# Patient Record
Sex: MALE | Race: WHITE | NOT HISPANIC OR LATINO
[De-identification: names, ages, dates, MRNs, and addresses within clinical notes are randomized per-mention and may not be internally consistent; named-entity substitution may affect disease eponyms.]

---

## 2017-07-07 ENCOUNTER — APPOINTMENT (OUTPATIENT)
Dept: CARDIOLOGY | Facility: CLINIC | Age: 62
End: 2017-07-07

## 2017-08-02 ENCOUNTER — RX RENEWAL (OUTPATIENT)
Age: 62
End: 2017-08-02

## 2018-01-28 ENCOUNTER — RX RENEWAL (OUTPATIENT)
Age: 63
End: 2018-01-28

## 2018-03-14 ENCOUNTER — APPOINTMENT (OUTPATIENT)
Dept: CARDIOLOGY | Facility: CLINIC | Age: 63
End: 2018-03-14

## 2018-03-14 ENCOUNTER — NON-APPOINTMENT (OUTPATIENT)
Age: 63
End: 2018-03-14

## 2018-03-14 ENCOUNTER — APPOINTMENT (OUTPATIENT)
Dept: CARDIOLOGY | Facility: CLINIC | Age: 63
End: 2018-03-14
Payer: COMMERCIAL

## 2018-03-14 VITALS
RESPIRATION RATE: 16 BRPM | HEART RATE: 87 BPM | DIASTOLIC BLOOD PRESSURE: 84 MMHG | SYSTOLIC BLOOD PRESSURE: 130 MMHG | OXYGEN SATURATION: 95 % | HEIGHT: 68 IN | WEIGHT: 194 LBS | BODY MASS INDEX: 29.4 KG/M2

## 2018-03-14 DIAGNOSIS — R00.2 PALPITATIONS: ICD-10-CM

## 2018-03-14 PROCEDURE — 93000 ELECTROCARDIOGRAM COMPLETE: CPT

## 2018-03-14 PROCEDURE — 99214 OFFICE O/P EST MOD 30 MIN: CPT

## 2018-04-09 ENCOUNTER — APPOINTMENT (OUTPATIENT)
Dept: ELECTROPHYSIOLOGY | Facility: CLINIC | Age: 63
End: 2018-04-09

## 2018-06-25 ENCOUNTER — RX RENEWAL (OUTPATIENT)
Age: 63
End: 2018-06-25

## 2018-09-26 ENCOUNTER — RX RENEWAL (OUTPATIENT)
Age: 63
End: 2018-09-26

## 2018-09-26 ENCOUNTER — NON-APPOINTMENT (OUTPATIENT)
Age: 63
End: 2018-09-26

## 2018-09-26 ENCOUNTER — APPOINTMENT (OUTPATIENT)
Dept: CARDIOLOGY | Facility: CLINIC | Age: 63
End: 2018-09-26
Payer: COMMERCIAL

## 2018-09-26 VITALS
HEART RATE: 74 BPM | BODY MASS INDEX: 28.19 KG/M2 | RESPIRATION RATE: 15 BRPM | WEIGHT: 186 LBS | SYSTOLIC BLOOD PRESSURE: 131 MMHG | HEIGHT: 68 IN | OXYGEN SATURATION: 100 % | DIASTOLIC BLOOD PRESSURE: 85 MMHG

## 2018-09-26 PROCEDURE — 93000 ELECTROCARDIOGRAM COMPLETE: CPT

## 2018-09-26 PROCEDURE — 99215 OFFICE O/P EST HI 40 MIN: CPT

## 2018-09-26 RX ORDER — NICOTINE 21 MG/24HR
14 PATCH, TRANSDERMAL 24 HOURS TRANSDERMAL DAILY
Qty: 14 | Refills: 0 | Status: ACTIVE | COMMUNITY
Start: 2018-09-26 | End: 1900-01-01

## 2018-09-26 RX ORDER — PHENYLEPHRINE HCL 10 MG
7 TABLET ORAL DAILY
Qty: 14 | Refills: 0 | Status: ACTIVE | COMMUNITY
Start: 2018-09-26 | End: 1900-01-01

## 2018-09-26 RX ORDER — NICOTINE 21 MG/24HR
21 PATCH, TRANSDERMAL 24 HOURS TRANSDERMAL
Qty: 28 | Refills: 0 | Status: ACTIVE | COMMUNITY
Start: 2018-09-26 | End: 1900-01-01

## 2018-10-24 ENCOUNTER — APPOINTMENT (OUTPATIENT)
Dept: CARDIOLOGY | Facility: CLINIC | Age: 63
End: 2018-10-24

## 2018-12-19 ENCOUNTER — NON-APPOINTMENT (OUTPATIENT)
Age: 63
End: 2018-12-19

## 2018-12-19 ENCOUNTER — APPOINTMENT (OUTPATIENT)
Dept: CARDIOLOGY | Facility: CLINIC | Age: 63
End: 2018-12-19
Payer: COMMERCIAL

## 2018-12-19 VITALS
OXYGEN SATURATION: 100 % | SYSTOLIC BLOOD PRESSURE: 119 MMHG | BODY MASS INDEX: 29.1 KG/M2 | WEIGHT: 192 LBS | RESPIRATION RATE: 15 BRPM | HEIGHT: 68 IN | HEART RATE: 64 BPM | DIASTOLIC BLOOD PRESSURE: 78 MMHG

## 2018-12-19 DIAGNOSIS — F17.200 NICOTINE DEPENDENCE, UNSPECIFIED, UNCOMPLICATED: ICD-10-CM

## 2018-12-19 PROCEDURE — 93000 ELECTROCARDIOGRAM COMPLETE: CPT

## 2018-12-19 PROCEDURE — 99214 OFFICE O/P EST MOD 30 MIN: CPT

## 2018-12-19 NOTE — DISCUSSION/SUMMARY
[Procedure Low Risk] : the procedure risk is low [Patient Intermediate Risk] : the patient is an intermediate risk [Optimized for Surgery] : the patient is optimized for surgery [FreeTextEntry1] : Without evidence or recent infarction overt heart failure or unstable angina and based upon a satisfactory coronary angiogram with a patent LIMA graft, Mr Shah may undergo planned upper and lower GI endoscopy which constitutes a low risk procedure in  a patient with intermediate cardiac risk at an ASA class II or III anesthesia risk. \par \par

## 2018-12-19 NOTE — PHYSICAL EXAM
[General Appearance - Well Developed] : well developed [Normal Appearance] : normal appearance [Well Groomed] : well groomed [General Appearance - Well Nourished] : well nourished [No Deformities] : no deformities [General Appearance - In No Acute Distress] : no acute distress [Normal Conjunctiva] : the conjunctiva exhibited no abnormalities [Eyelids - No Xanthelasma] : the eyelids demonstrated no xanthelasmas [Normal Oral Mucosa] : normal oral mucosa [No Oral Pallor] : no oral pallor [No Oral Cyanosis] : no oral cyanosis [Normal Jugular Venous A Waves Present] : normal jugular venous A waves present [Normal Jugular Venous V Waves Present] : normal jugular venous V waves present [No Jugular Venous Walker A Waves] : no jugular venous walker A waves [Respiration, Rhythm And Depth] : normal respiratory rhythm and effort [Exaggerated Use Of Accessory Muscles For Inspiration] : no accessory muscle use [Auscultation Breath Sounds / Voice Sounds] : lungs were clear to auscultation bilaterally [Heart Rate And Rhythm] : heart rate and rhythm were normal [Heart Sounds] : normal S1 and S2 [Murmurs] : no murmurs present [Abdomen Soft] : soft [Abdomen Tenderness] : non-tender [Abdomen Mass (___ Cm)] : no abdominal mass palpated [Abnormal Walk] : normal gait [Gait - Sufficient For Exercise Testing] : the gait was sufficient for exercise testing [Nail Clubbing] : no clubbing of the fingernails [Cyanosis, Localized] : no localized cyanosis [Petechial Hemorrhages (___cm)] : no petechial hemorrhages [Skin Color & Pigmentation] : normal skin color and pigmentation [] : no rash [No Venous Stasis] : no venous stasis [Skin Lesions] : no skin lesions [No Skin Ulcers] : no skin ulcer [No Xanthoma] : no  xanthoma was observed [Oriented To Time, Place, And Person] : oriented to person, place, and time [Affect] : the affect was normal [Mood] : the mood was normal [FreeTextEntry1] : Has a support group for anxiety

## 2018-12-19 NOTE — HISTORY OF PRESENT ILLNESS
[Preoperative Visit] : for a medical evaluation prior to surgery [Scheduled Procedure ___] : a [unfilled] [Date of Surgery ___] : on [unfilled] [Surgeon Name ___] : surgeon: [unfilled] [de-identified] : [address] [id number] [FreeTextEntry1] : Dimitry   comes today for clarification of  his overall cardiovascular risk. he  was advised to undergo a complete cardiac evaluation. He denies chest pains shortness of breath or loss of consciousness.

## 2019-01-10 ENCOUNTER — RX RENEWAL (OUTPATIENT)
Age: 64
End: 2019-01-10

## 2019-02-13 ENCOUNTER — NON-APPOINTMENT (OUTPATIENT)
Age: 64
End: 2019-02-13

## 2019-02-13 ENCOUNTER — APPOINTMENT (OUTPATIENT)
Dept: CARDIOLOGY | Facility: CLINIC | Age: 64
End: 2019-02-13
Payer: COMMERCIAL

## 2019-02-13 VITALS
SYSTOLIC BLOOD PRESSURE: 137 MMHG | WEIGHT: 192 LBS | RESPIRATION RATE: 16 BRPM | HEART RATE: 67 BPM | HEIGHT: 68 IN | OXYGEN SATURATION: 93 % | DIASTOLIC BLOOD PRESSURE: 80 MMHG | BODY MASS INDEX: 29.1 KG/M2

## 2019-02-13 PROCEDURE — 99214 OFFICE O/P EST MOD 30 MIN: CPT

## 2019-02-13 PROCEDURE — 93000 ELECTROCARDIOGRAM COMPLETE: CPT

## 2019-02-14 NOTE — DISCUSSION/SUMMARY
[Not Responding to Treatment] : not responding to treatment [FreeTextEntry1] : he still has an elevated triglycerides which may be a  sign of diabetes and abnormal lipids. He is on fish oil 1500 mg. He see Dr Malone and was told  to check  blood work. RV 3 m check A1c and sugar as per medicine

## 2019-02-14 NOTE — REASON FOR VISIT
[FreeTextEntry1] : Dimitry has reflux and was placed on  pantoprazole,now he has no issues with acid reflux. He had it in the center of chest. he got a microbial. infection from a nasal spray. he had symptoms of diabetes. he was given a nasal spray. he used a saline solution.

## 2019-04-07 ENCOUNTER — RX RENEWAL (OUTPATIENT)
Age: 64
End: 2019-04-07

## 2019-06-21 ENCOUNTER — NON-APPOINTMENT (OUTPATIENT)
Age: 64
End: 2019-06-21

## 2019-06-21 ENCOUNTER — APPOINTMENT (OUTPATIENT)
Dept: CARDIOLOGY | Facility: CLINIC | Age: 64
End: 2019-06-21
Payer: COMMERCIAL

## 2019-06-21 VITALS
HEART RATE: 78 BPM | RESPIRATION RATE: 15 BRPM | BODY MASS INDEX: 30.16 KG/M2 | SYSTOLIC BLOOD PRESSURE: 128 MMHG | DIASTOLIC BLOOD PRESSURE: 84 MMHG | OXYGEN SATURATION: 96 % | WEIGHT: 199 LBS | HEIGHT: 68 IN

## 2019-06-21 PROCEDURE — 93000 ELECTROCARDIOGRAM COMPLETE: CPT

## 2019-06-21 PROCEDURE — 99214 OFFICE O/P EST MOD 30 MIN: CPT

## 2019-06-22 NOTE — DISCUSSION/SUMMARY
[Coronary Artery Disease] : coronary artery disease [Stable] : stable [Hyperlipidemia] : hyperlipidemia [de-identified] : will review labs

## 2019-06-22 NOTE — REASON FOR VISIT
[Follow-Up - Clinic] : a clinic follow-up of [Coronary Artery Disease] : coronary artery disease [FreeTextEntry1] : Dimitry reports that an endoscopy was satisfactory he sees a doctor noel Low and that the [facility] office it upper and lower endoscopy is currently off protonic's he takes two fish capsules 1500 mg for triglycerides will review bloodwork from Dr. Warner's office

## 2019-06-22 NOTE — PHYSICAL EXAM
[Normal Appearance] : normal appearance [General Appearance - Well Developed] : well developed [Well Groomed] : well groomed [No Deformities] : no deformities [General Appearance - Well Nourished] : well nourished [General Appearance - In No Acute Distress] : no acute distress [Eyelids - No Xanthelasma] : the eyelids demonstrated no xanthelasmas [Normal Oral Mucosa] : normal oral mucosa [No Oral Pallor] : no oral pallor [Normal Conjunctiva] : the conjunctiva exhibited no abnormalities [No Oral Cyanosis] : no oral cyanosis [Normal Jugular Venous V Waves Present] : normal jugular venous V waves present [Normal Jugular Venous A Waves Present] : normal jugular venous A waves present [Exaggerated Use Of Accessory Muscles For Inspiration] : no accessory muscle use [No Jugular Venous Walker A Waves] : no jugular venous walker A waves [Respiration, Rhythm And Depth] : normal respiratory rhythm and effort [Heart Rate And Rhythm] : heart rate and rhythm were normal [Heart Sounds] : normal S1 and S2 [Auscultation Breath Sounds / Voice Sounds] : lungs were clear to auscultation bilaterally [Abdomen Soft] : soft [Murmurs] : no murmurs present [Abdomen Tenderness] : non-tender [Abdomen Mass (___ Cm)] : no abdominal mass palpated [Abnormal Walk] : normal gait [Cyanosis, Localized] : no localized cyanosis [Nail Clubbing] : no clubbing of the fingernails [Gait - Sufficient For Exercise Testing] : the gait was sufficient for exercise testing [Petechial Hemorrhages (___cm)] : no petechial hemorrhages [] : no ischemic changes [Skin Color & Pigmentation] : normal skin color and pigmentation [Skin Lesions] : no skin lesions [No Xanthoma] : no  xanthoma was observed [No Venous Stasis] : no venous stasis [No Skin Ulcers] : no skin ulcer [Oriented To Time, Place, And Person] : oriented to person, place, and time [Mood] : the mood was normal [Affect] : the affect was normal [FreeTextEntry1] : Has a support group for anxiety

## 2019-07-04 RX ORDER — ATORVASTATIN CALCIUM 80 MG/1
80 TABLET, FILM COATED ORAL
Qty: 90 | Refills: 1 | Status: ACTIVE | COMMUNITY
Start: 2019-07-04

## 2019-07-19 ENCOUNTER — RX RENEWAL (OUTPATIENT)
Age: 64
End: 2019-07-19

## 2019-08-13 ENCOUNTER — APPOINTMENT (OUTPATIENT)
Dept: CARDIOLOGY | Facility: CLINIC | Age: 64
End: 2019-08-13
Payer: COMMERCIAL

## 2019-08-13 PROCEDURE — 78452 HT MUSCLE IMAGE SPECT MULT: CPT

## 2019-08-13 PROCEDURE — 93015 CV STRESS TEST SUPVJ I&R: CPT

## 2019-08-13 PROCEDURE — A9500: CPT

## 2019-09-27 ENCOUNTER — APPOINTMENT (OUTPATIENT)
Dept: CARDIOLOGY | Facility: CLINIC | Age: 64
End: 2019-09-27

## 2019-10-20 ENCOUNTER — RX RENEWAL (OUTPATIENT)
Age: 64
End: 2019-10-20

## 2020-01-25 ENCOUNTER — RX RENEWAL (OUTPATIENT)
Age: 65
End: 2020-01-25

## 2020-01-31 ENCOUNTER — APPOINTMENT (OUTPATIENT)
Dept: CARDIOLOGY | Facility: CLINIC | Age: 65
End: 2020-01-31
Payer: COMMERCIAL

## 2020-01-31 VITALS
HEART RATE: 62 BPM | OXYGEN SATURATION: 99 % | DIASTOLIC BLOOD PRESSURE: 80 MMHG | BODY MASS INDEX: 30.31 KG/M2 | HEIGHT: 68 IN | SYSTOLIC BLOOD PRESSURE: 139 MMHG | WEIGHT: 200 LBS | RESPIRATION RATE: 17 BRPM

## 2020-01-31 PROCEDURE — 99214 OFFICE O/P EST MOD 30 MIN: CPT

## 2020-01-31 PROCEDURE — 93000 ELECTROCARDIOGRAM COMPLETE: CPT

## 2020-02-02 NOTE — PHYSICAL EXAM
[General Appearance - Well Developed] : well developed [Well Groomed] : well groomed [Normal Appearance] : normal appearance [General Appearance - Well Nourished] : well nourished [General Appearance - In No Acute Distress] : no acute distress [No Deformities] : no deformities [Eyelids - No Xanthelasma] : the eyelids demonstrated no xanthelasmas [Normal Conjunctiva] : the conjunctiva exhibited no abnormalities [No Oral Pallor] : no oral pallor [Normal Oral Mucosa] : normal oral mucosa [Normal Jugular Venous A Waves Present] : normal jugular venous A waves present [No Oral Cyanosis] : no oral cyanosis [Normal Jugular Venous V Waves Present] : normal jugular venous V waves present [No Jugular Venous Walker A Waves] : no jugular venous walker A waves [Respiration, Rhythm And Depth] : normal respiratory rhythm and effort [Exaggerated Use Of Accessory Muscles For Inspiration] : no accessory muscle use [Auscultation Breath Sounds / Voice Sounds] : lungs were clear to auscultation bilaterally [Heart Rate And Rhythm] : heart rate and rhythm were normal [Heart Sounds] : normal S1 and S2 [Murmurs] : no murmurs present [Abdomen Soft] : soft [Abdomen Tenderness] : non-tender [Gait - Sufficient For Exercise Testing] : the gait was sufficient for exercise testing [Abnormal Walk] : normal gait [Nail Clubbing] : no clubbing of the fingernails [Abdomen Mass (___ Cm)] : no abdominal mass palpated [Cyanosis, Localized] : no localized cyanosis [Skin Color & Pigmentation] : normal skin color and pigmentation [Petechial Hemorrhages (___cm)] : no petechial hemorrhages [] : no rash [No Venous Stasis] : no venous stasis [Skin Lesions] : no skin lesions [No Xanthoma] : no  xanthoma was observed [No Skin Ulcers] : no skin ulcer [Oriented To Time, Place, And Person] : oriented to person, place, and time [Mood] : the mood was normal [Affect] : the affect was normal [FreeTextEntry1] : Has a support group for anxiety

## 2020-02-02 NOTE — ASSESSMENT
[FreeTextEntry1] : A recent nuclear stress test is negative for ischemia . However,  this may be a balanced situation. we are concerned that his current symptoms may even reflect a recurrent ischemia and therefore asked Jeb to undergo a surgical evaluation with Dr. Mixon or Dr. Rainey  at [facility]\par \par  medical necessity\par  this is a moderate risk encounter based upon concern over recurrent symptoms which may represent angina and review of prior cardiac catheterization demonstrating advanced coronary disease requiring referral for a cardiovascular surgical evaluation at a tertiary hospital

## 2020-02-02 NOTE — REASON FOR VISIT
[Coronary Artery Disease] : coronary artery disease [Follow-Up - Clinic] : a clinic follow-up of [CABG Follow-up] : bypass graft [Hyperlipidemia] : hyperlipidemia [Medication Management] : Medication management [FreeTextEntry1] : Jeb has acid reflux. He can not eat  peaqnut butter othrewise he gets reflux. it thors the whole system off. he has bad reflux. he is stil smoking.  Jeb is otherwise doing well however we know that his cornary anatomy is difficult given closed native vessels and he is protected only by a patent internal memory artery.

## 2020-02-04 ENCOUNTER — NON-APPOINTMENT (OUTPATIENT)
Age: 65
End: 2020-02-04

## 2020-02-25 ENCOUNTER — APPOINTMENT (OUTPATIENT)
Dept: CARDIOTHORACIC SURGERY | Facility: CLINIC | Age: 65
End: 2020-02-25
Payer: COMMERCIAL

## 2020-02-25 VITALS
RESPIRATION RATE: 12 BRPM | TEMPERATURE: 98 F | DIASTOLIC BLOOD PRESSURE: 93 MMHG | HEART RATE: 64 BPM | OXYGEN SATURATION: 96 % | SYSTOLIC BLOOD PRESSURE: 152 MMHG

## 2020-02-25 VITALS — WEIGHT: 200 LBS | HEIGHT: 68 IN | BODY MASS INDEX: 30.31 KG/M2

## 2020-02-25 DIAGNOSIS — Z82.3 FAMILY HISTORY OF STROKE: ICD-10-CM

## 2020-02-25 DIAGNOSIS — K21.0 GASTRO-ESOPHAGEAL REFLUX DISEASE WITH ESOPHAGITIS: ICD-10-CM

## 2020-02-25 DIAGNOSIS — Z82.5 FAMILY HISTORY OF ASTHMA AND OTHER CHRONIC LOWER RESPIRATORY DISEASES: ICD-10-CM

## 2020-02-25 DIAGNOSIS — K21.9 GASTRO-ESOPHAGEAL REFLUX DISEASE W/OUT ESOPHAGITIS: ICD-10-CM

## 2020-02-25 PROCEDURE — 99203 OFFICE O/P NEW LOW 30 MIN: CPT

## 2020-02-25 RX ORDER — SERTRALINE 25 MG/1
25 TABLET, FILM COATED ORAL EVERY OTHER DAY
Refills: 0 | Status: ACTIVE | COMMUNITY
Start: 2020-02-25

## 2020-02-25 RX ORDER — FAMOTIDINE 10 MG/1
10 TABLET, FILM COATED ORAL DAILY
Refills: 0 | Status: ACTIVE | COMMUNITY
Start: 2020-02-25

## 2020-02-25 RX ORDER — ASPIRIN 325 MG/1
325 TABLET, FILM COATED ORAL DAILY
Qty: 15 | Refills: 0 | Status: ACTIVE | COMMUNITY
Start: 2020-02-25

## 2020-02-25 NOTE — REVIEW OF SYSTEMS
[Feeling Tired] : feeling tired [Heartburn] : heartburn [Anxiety] : anxiety [Negative] : Endocrine [Palpitations] : no palpitations [Chest Pain] : no chest pain [Lower Ext Edema] : no extremity edema [Dizziness] : no dizziness [Fainting] : no fainting

## 2020-02-25 NOTE — CONSULT LETTER
[Dear  ___] : Dear ~SILVANO, [Courtesy Letter:] : I had the pleasure of seeing your patient, [unfilled], in my office today. [Please see my note below.] : Please see my note below. [Consult Closing:] : Thank you very much for allowing me to participate in the care of this patient.  If you have any questions, please do not hesitate to contact me. [Sincerely,] : Sincerely, [FreeTextEntry2] : Santiago Lynn MD\par [address]\[place]  [address] [FreeTextEntry3] : Arben Mixon MD\par  & \par \par Cardiovascular & Thoracic Surgery\par [facility] \par [address]\par [facility] [address]\par

## 2020-02-25 NOTE — PHYSICAL EXAM
[General Appearance - In No Acute Distress] : in no acute distress [General Appearance - Alert] : alert [Extraocular Movements] : extraocular movements were intact [PERRL With Normal Accommodation] : pupils were equal in size, round, and reactive to light [Sclera] : the sclera and conjunctiva were normal [Jugular Venous Distention Increased] : there was no jugular-venous distention [] : no respiratory distress [Outer Ear] : the ears and nose were normal in appearance [Heart Rate And Rhythm] : heart rate was normal and rhythm regular [Respiration, Rhythm And Depth] : normal respiratory rhythm and effort [Surgical / Traumatic Scar Sternum] : a scar [Breast Appearance] : normal in appearance [Bowel Sounds] : normal bowel sounds [Abdomen Soft] : soft [Cervical Lymph Nodes Enlarged Posterior Bilaterally] : posterior cervical [Skin Color & Pigmentation] : normal skin color and pigmentation [Involuntary Movements] : no involuntary movements were seen [No CVA Tenderness] : no ~M costovertebral angle tenderness [Impaired Insight] : insight and judgment were intact [Oriented To Time, Place, And Person] : oriented to person, place, and time [No Focal Deficits] : no focal deficits [Right Carotid Bruit] : no bruit heard over the right carotid [Left Carotid Bruit] : no bruit heard over the left carotid [FreeTextEntry1] : deferred

## 2020-02-25 NOTE — HISTORY OF PRESENT ILLNESS
[FreeTextEntry1] : SELENA MENJIVAR is a 64 year old  male current smoker (1 ppd) with PMH of anxiety, CAD/MI (s/p CABG, LIMA to LAD in 1997 by Dr. Mclain at [facility]), HTN, HLD and GERD. He follows with Dr. MARIAM Lynn (x 20 years) for his cardiology care.  He was referred for surgical consultation due to his coronary anatomy being difficult given closed native vessels and single patent internal mammary artery. In august 2018, he was experiencing indigestion and he reports "having a heart attack." At 4 am he felt his chest was heavy or a "bubble", at 11 am he went [place] underwent a cardiac catherization which revealed 2 occluded grafts, patent LIMA to LAD. Metoprolol was increased. He felt "chest bubble" 1 week ago. He took Pepcid and the feeling subsided. EGD/colonoscopy which was unremarkable within the last 1-2 years. He takes Pepcid PRN. \par \par GI: Dr. Mcnamara ([place])\par Cards: Dr. Santiago Lynn

## 2020-02-25 NOTE — DATA REVIEWED
[FreeTextEntry1] : Echo: 7/19/18\par normal LV function LVEF 65-70%. \par \par Cardiac Catherization on 8/14/18 demonstrated \par LIMA to LAD normal \par om 1 rca 1 occluded \par

## 2020-02-25 NOTE — ASSESSMENT
[FreeTextEntry1] : SELENA MENJIVAR is a 64 year old  male current smoker (1 ppd x 45 years) with PMH of anxiety, CAD/MI (s/p 3VCABG, LIMA to LAD in 1997 by Dr. Mclain at [facility]), HTN, HLD and GERD. He follows with Dr. MARIAM Lynn (x 20 years) for his cardiology care.  He was referred for surgical consultation.  In August 2018, he was experiencing indigestion and he reports feeling as if " I was having a heart attack." At 4 am he felt his chest was heavy or a "bubble", at 11 am he went [place] underwent a cardiac Catherization which revealed 2 occluded grafts and patent LIMA to LAD. Metoprolol dose was increased. He felt "chest bubble" 1 week ago. He took Pepcid and the feeling subsided. He underwent an EGD/colonoscopy which was unremarkable within the last 1-2 years.\par \par Cardiac imaging, medical records and reports reviewed at length with patient. Coronary artery disease with anginal symptoms. Risks, benefits and alternatives to surgery discussed. Risks included but not limited to bleeding, stroke, myocardial Infarction, kidney problems, blood transfusion, permanent pacemaker implantation, infections and death. Operative mortality and complication risks are low. Various approaches discussed in detail. All questions have been fully answered and concerns addressed. \par \par Plan:\par 1) Repeat Cardiac catherization \par 2) Smoking cessation\par 3) Continue current medication regimen \par \par  Written by Delisa Livingston NP, acting as a scribe for Dr. Mixon.\par “The documentation recorded by the scribe accurately reflects the service I personally performed and the decisions made by me.” Signature Arben Mixon MD.\par \par \par \par \par

## 2020-03-04 ENCOUNTER — OUTPATIENT (OUTPATIENT)
Dept: OUTPATIENT SERVICES | Facility: HOSPITAL | Age: 65
LOS: 1 days | End: 2020-03-04
Payer: COMMERCIAL

## 2020-03-04 VITALS
DIASTOLIC BLOOD PRESSURE: 71 MMHG | WEIGHT: 199.96 LBS | RESPIRATION RATE: 16 BRPM | SYSTOLIC BLOOD PRESSURE: 122 MMHG | HEART RATE: 83 BPM | HEIGHT: 68 IN | OXYGEN SATURATION: 92 % | TEMPERATURE: 99 F

## 2020-03-04 DIAGNOSIS — I25.10 ATHEROSCLEROTIC HEART DISEASE OF NATIVE CORONARY ARTERY WITHOUT ANGINA PECTORIS: ICD-10-CM

## 2020-03-04 DIAGNOSIS — Z95.1 PRESENCE OF AORTOCORONARY BYPASS GRAFT: Chronic | ICD-10-CM

## 2020-03-04 LAB
ANION GAP SERPL CALC-SCNC: 13 MMOL/L — SIGNIFICANT CHANGE UP (ref 5–17)
BUN SERPL-MCNC: 14 MG/DL — SIGNIFICANT CHANGE UP (ref 7–23)
CALCIUM SERPL-MCNC: 9.3 MG/DL — SIGNIFICANT CHANGE UP (ref 8.4–10.5)
CHLORIDE SERPL-SCNC: 103 MMOL/L — SIGNIFICANT CHANGE UP (ref 96–108)
CO2 SERPL-SCNC: 21 MMOL/L — LOW (ref 22–31)
CREAT SERPL-MCNC: 0.68 MG/DL — SIGNIFICANT CHANGE UP (ref 0.5–1.3)
GLUCOSE SERPL-MCNC: 114 MG/DL — HIGH (ref 70–99)
HCT VFR BLD CALC: 47.4 % — SIGNIFICANT CHANGE UP (ref 39–50)
HGB BLD-MCNC: 14.6 G/DL — SIGNIFICANT CHANGE UP (ref 13–17)
MCHC RBC-ENTMCNC: 28.3 PG — SIGNIFICANT CHANGE UP (ref 27–34)
MCHC RBC-ENTMCNC: 30.8 GM/DL — LOW (ref 32–36)
MCV RBC AUTO: 92 FL — SIGNIFICANT CHANGE UP (ref 80–100)
NRBC # BLD: 0 /100 WBCS — SIGNIFICANT CHANGE UP (ref 0–0)
PLATELET # BLD AUTO: 190 K/UL — SIGNIFICANT CHANGE UP (ref 150–400)
POTASSIUM SERPL-MCNC: 5 MMOL/L — SIGNIFICANT CHANGE UP (ref 3.5–5.3)
POTASSIUM SERPL-SCNC: 5 MMOL/L — SIGNIFICANT CHANGE UP (ref 3.5–5.3)
RBC # BLD: 5.15 M/UL — SIGNIFICANT CHANGE UP (ref 4.2–5.8)
RBC # FLD: 13.6 % — SIGNIFICANT CHANGE UP (ref 10.3–14.5)
SODIUM SERPL-SCNC: 137 MMOL/L — SIGNIFICANT CHANGE UP (ref 135–145)
WBC # BLD: 10.81 K/UL — HIGH (ref 3.8–10.5)
WBC # FLD AUTO: 10.81 K/UL — HIGH (ref 3.8–10.5)

## 2020-03-04 PROCEDURE — 85027 COMPLETE CBC AUTOMATED: CPT

## 2020-03-04 PROCEDURE — 80048 BASIC METABOLIC PNL TOTAL CA: CPT

## 2020-03-04 PROCEDURE — C1894: CPT

## 2020-03-04 PROCEDURE — 93005 ELECTROCARDIOGRAM TRACING: CPT

## 2020-03-04 PROCEDURE — 99153 MOD SED SAME PHYS/QHP EA: CPT

## 2020-03-04 PROCEDURE — 93567 NJX CAR CTH SPRVLV AORTGRPHY: CPT

## 2020-03-04 PROCEDURE — C1887: CPT

## 2020-03-04 PROCEDURE — C1769: CPT

## 2020-03-04 PROCEDURE — 93457 R HRT ART/GRFT ANGIO: CPT

## 2020-03-04 PROCEDURE — 93457 R HRT ART/GRFT ANGIO: CPT | Mod: 26

## 2020-03-04 PROCEDURE — 99152 MOD SED SAME PHYS/QHP 5/>YRS: CPT

## 2020-03-04 PROCEDURE — 93010 ELECTROCARDIOGRAM REPORT: CPT

## 2020-03-04 RX ORDER — METOPROLOL TARTRATE 50 MG
1 TABLET ORAL
Qty: 0 | Refills: 0 | DISCHARGE

## 2020-03-04 RX ORDER — LISINOPRIL 2.5 MG/1
1 TABLET ORAL
Qty: 0 | Refills: 0 | DISCHARGE

## 2020-03-04 RX ORDER — OMEGA-3 ACID ETHYL ESTERS 1 G
0 CAPSULE ORAL
Qty: 0 | Refills: 0 | DISCHARGE

## 2020-03-04 RX ORDER — FAMOTIDINE 10 MG/ML
1 INJECTION INTRAVENOUS
Qty: 0 | Refills: 0 | DISCHARGE

## 2020-03-04 RX ORDER — SERTRALINE 25 MG/1
0.5 TABLET, FILM COATED ORAL
Qty: 0 | Refills: 0 | DISCHARGE

## 2020-03-04 RX ORDER — ATORVASTATIN CALCIUM 80 MG/1
1 TABLET, FILM COATED ORAL
Qty: 0 | Refills: 0 | DISCHARGE

## 2020-03-04 NOTE — H&P CARDIOLOGY - HISTORY OF PRESENT ILLNESS
65 y/o  male, Smoker; 45pk/yr, CAD, s/p CABG, 3V in 1997, HTN, HLD, Anxiety presents today for R + LHC. Patient had an episode of chest pain in 2018, was seen by Dr. Lynn, LHC was done at [facility] found LIMA to LAD normal, OM1 and RCA occluded. TTE revealed normal LV function with EF of 65-70%. Patient was evaluated by Dr. Mixon and recommended for R+L heart cath.   Narrative:     Symptoms:        Angina (Class): Yes       Ischemic Symptoms: Yes    Heart Failure:        Systolic/Diastolic/Combined: Yes       NYHA Class (within 2 weeks):     Assessment of LVEF: 65-70%       EF:        Assessed by: TTE       Date: 7/19/2018    Prior Cardiac Interventions:       PCI's: No       CABG: In 1997, 3V        Myocardial Imaging:        Risk Assessment:   Antianginal Therapies:        Beta Blockers:  Yes       Calcium Channel Blockers: No       Long Acting Nitrates: No       Ranexa: No    Associated Risk Factors:        Cerebrovascular Disease: N/A       Chronic Lung Disease: Yes, Bronchitis       Peripheral Arterial Disease: N/A       Chronic Kidney Disease (if yes, what is GFR): N/A       Uncontrolled Diabetes (if yes, what is HgbA1C or FBS): N/A       Poorly Controlled Hypertension (if yes, what is SBP): N/A       Morbid Obesity (if yes, what is BMI): N/A       History of Recent Ventricular Arrhythmia: N/A       Inability to Ambulate Safely: N/A       Need for Therapeutic Anticoagulation: N/A       Antiplatelet or Contrast Allergy: N/A

## 2020-03-04 NOTE — H&P CARDIOLOGY - PMH
Anxiety    CAD (coronary artery disease)  CABG in 1997  COPD (chronic obstructive pulmonary disease)    HLD (hyperlipidemia)    HTN (hypertension)    Smoker

## 2020-03-13 PROBLEM — I25.10 ATHEROSCLEROTIC HEART DISEASE OF NATIVE CORONARY ARTERY WITHOUT ANGINA PECTORIS: Chronic | Status: ACTIVE | Noted: 2020-03-04

## 2020-03-13 PROBLEM — I10 ESSENTIAL (PRIMARY) HYPERTENSION: Chronic | Status: ACTIVE | Noted: 2020-03-04

## 2020-03-13 PROBLEM — F17.200 NICOTINE DEPENDENCE, UNSPECIFIED, UNCOMPLICATED: Chronic | Status: ACTIVE | Noted: 2020-03-04

## 2020-03-13 PROBLEM — E78.5 HYPERLIPIDEMIA, UNSPECIFIED: Chronic | Status: ACTIVE | Noted: 2020-03-04

## 2020-03-13 PROBLEM — J44.9 CHRONIC OBSTRUCTIVE PULMONARY DISEASE, UNSPECIFIED: Chronic | Status: ACTIVE | Noted: 2020-03-04

## 2020-03-13 PROBLEM — F41.9 ANXIETY DISORDER, UNSPECIFIED: Chronic | Status: ACTIVE | Noted: 2020-03-04

## 2020-04-27 ENCOUNTER — RX RENEWAL (OUTPATIENT)
Age: 65
End: 2020-04-27

## 2020-05-01 ENCOUNTER — APPOINTMENT (OUTPATIENT)
Dept: CARDIOLOGY | Facility: CLINIC | Age: 65
End: 2020-05-01
Payer: COMMERCIAL

## 2020-05-01 PROCEDURE — 99442: CPT

## 2020-05-05 ENCOUNTER — OUTPATIENT (OUTPATIENT)
Dept: OUTPATIENT SERVICES | Facility: HOSPITAL | Age: 65
LOS: 1 days | End: 2020-05-05
Payer: COMMERCIAL

## 2020-05-05 DIAGNOSIS — Z95.1 PRESENCE OF AORTOCORONARY BYPASS GRAFT: Chronic | ICD-10-CM

## 2020-05-05 DIAGNOSIS — Z11.59 ENCOUNTER FOR SCREENING FOR OTHER VIRAL DISEASES: ICD-10-CM

## 2020-05-05 LAB — SARS-COV-2 RNA SPEC QL NAA+PROBE: SIGNIFICANT CHANGE UP

## 2020-05-05 PROCEDURE — 87635 SARS-COV-2 COVID-19 AMP PRB: CPT

## 2020-05-06 ENCOUNTER — INPATIENT (INPATIENT)
Facility: HOSPITAL | Age: 65
LOS: 0 days | Discharge: HOME | DRG: 247 | End: 2020-05-07
Attending: INTERNAL MEDICINE | Admitting: INTERNAL MEDICINE
Payer: COMMERCIAL

## 2020-05-06 VITALS
TEMPERATURE: 98 F | HEIGHT: 68 IN | WEIGHT: 199.96 LBS | OXYGEN SATURATION: 98 % | RESPIRATION RATE: 16 BRPM | HEART RATE: 67 BPM | DIASTOLIC BLOOD PRESSURE: 82 MMHG | SYSTOLIC BLOOD PRESSURE: 130 MMHG

## 2020-05-06 DIAGNOSIS — I25.10 ATHEROSCLEROTIC HEART DISEASE OF NATIVE CORONARY ARTERY WITHOUT ANGINA PECTORIS: ICD-10-CM

## 2020-05-06 DIAGNOSIS — Z95.1 PRESENCE OF AORTOCORONARY BYPASS GRAFT: Chronic | ICD-10-CM

## 2020-05-06 LAB
ANION GAP SERPL CALC-SCNC: 12 MMOL/L — SIGNIFICANT CHANGE UP (ref 5–17)
BUN SERPL-MCNC: 13 MG/DL — SIGNIFICANT CHANGE UP (ref 7–23)
CALCIUM SERPL-MCNC: 9.3 MG/DL — SIGNIFICANT CHANGE UP (ref 8.4–10.5)
CHLORIDE SERPL-SCNC: 101 MMOL/L — SIGNIFICANT CHANGE UP (ref 96–108)
CO2 SERPL-SCNC: 25 MMOL/L — SIGNIFICANT CHANGE UP (ref 22–31)
CREAT SERPL-MCNC: 0.84 MG/DL — SIGNIFICANT CHANGE UP (ref 0.5–1.3)
GLUCOSE SERPL-MCNC: 114 MG/DL — HIGH (ref 70–99)
HCT VFR BLD CALC: 50.7 % — HIGH (ref 39–50)
HGB BLD-MCNC: 15.6 G/DL — SIGNIFICANT CHANGE UP (ref 13–17)
MCHC RBC-ENTMCNC: 29 PG — SIGNIFICANT CHANGE UP (ref 27–34)
MCHC RBC-ENTMCNC: 30.8 GM/DL — LOW (ref 32–36)
MCV RBC AUTO: 94.2 FL — SIGNIFICANT CHANGE UP (ref 80–100)
NRBC # BLD: 0 /100 WBCS — SIGNIFICANT CHANGE UP (ref 0–0)
PLATELET # BLD AUTO: 227 K/UL — SIGNIFICANT CHANGE UP (ref 150–400)
POTASSIUM SERPL-MCNC: 4.4 MMOL/L — SIGNIFICANT CHANGE UP (ref 3.5–5.3)
POTASSIUM SERPL-SCNC: 4.4 MMOL/L — SIGNIFICANT CHANGE UP (ref 3.5–5.3)
RBC # BLD: 5.38 M/UL — SIGNIFICANT CHANGE UP (ref 4.2–5.8)
RBC # FLD: 14.2 % — SIGNIFICANT CHANGE UP (ref 10.3–14.5)
SODIUM SERPL-SCNC: 138 MMOL/L — SIGNIFICANT CHANGE UP (ref 135–145)
WBC # BLD: 12.08 K/UL — HIGH (ref 3.8–10.5)
WBC # FLD AUTO: 12.08 K/UL — HIGH (ref 3.8–10.5)

## 2020-05-06 PROCEDURE — 92928 PRQ TCAT PLMT NTRAC ST 1 LES: CPT | Mod: LM

## 2020-05-06 PROCEDURE — 99152 MOD SED SAME PHYS/QHP 5/>YRS: CPT

## 2020-05-06 RX ORDER — ASPIRIN/CALCIUM CARB/MAGNESIUM 324 MG
325 TABLET ORAL DAILY
Refills: 0 | Status: DISCONTINUED | OUTPATIENT
Start: 2020-05-07 | End: 2020-05-07

## 2020-05-06 RX ORDER — METOPROLOL TARTRATE 50 MG
50 TABLET ORAL DAILY
Refills: 0 | Status: DISCONTINUED | OUTPATIENT
Start: 2020-05-06 | End: 2020-05-07

## 2020-05-06 RX ORDER — SERTRALINE 25 MG/1
12.5 TABLET, FILM COATED ORAL AT BEDTIME
Refills: 0 | Status: DISCONTINUED | OUTPATIENT
Start: 2020-05-06 | End: 2020-05-07

## 2020-05-06 RX ORDER — CLOPIDOGREL BISULFATE 75 MG/1
75 TABLET, FILM COATED ORAL DAILY
Refills: 0 | Status: DISCONTINUED | OUTPATIENT
Start: 2020-05-07 | End: 2020-05-07

## 2020-05-06 RX ORDER — LISINOPRIL 2.5 MG/1
10 TABLET ORAL DAILY
Refills: 0 | Status: DISCONTINUED | OUTPATIENT
Start: 2020-05-06 | End: 2020-05-07

## 2020-05-06 RX ORDER — ATORVASTATIN CALCIUM 80 MG/1
40 TABLET, FILM COATED ORAL AT BEDTIME
Refills: 0 | Status: DISCONTINUED | OUTPATIENT
Start: 2020-05-06 | End: 2020-05-07

## 2020-05-06 RX ORDER — ALPRAZOLAM 0.25 MG
0.5 TABLET ORAL ONCE
Refills: 0 | Status: DISCONTINUED | OUTPATIENT
Start: 2020-05-06 | End: 2020-05-06

## 2020-05-06 RX ADMIN — Medication 0.5 MILLIGRAM(S): at 22:06

## 2020-05-06 RX ADMIN — LISINOPRIL 10 MILLIGRAM(S): 2.5 TABLET ORAL at 14:24

## 2020-05-06 RX ADMIN — SERTRALINE 12.5 MILLIGRAM(S): 25 TABLET, FILM COATED ORAL at 22:06

## 2020-05-06 RX ADMIN — Medication 50 MILLIGRAM(S): at 14:23

## 2020-05-06 RX ADMIN — ATORVASTATIN CALCIUM 40 MILLIGRAM(S): 80 TABLET, FILM COATED ORAL at 22:06

## 2020-05-06 RX ADMIN — Medication 0.5 MILLIGRAM(S): at 15:34

## 2020-05-06 NOTE — H&P CARDIOLOGY - HISTORY OF PRESENT ILLNESS
65 y/o  male, Smoker; 45pk/yr, CAD s/p 3V CABG in 1997, HTN, HLD, Anxiety presents for staged PCI of left main today.  Denies chest pain, shortness of breath, palpitations. COVID negative 5/5/2020.  EF 65% on TTE.  Surgeon: Apurva

## 2020-05-06 NOTE — CHART NOTE - NSCHARTNOTEFT_GEN_A_CORE
Patient underwent a PCI procedure and is being admitted as they are at increased risk for major adverse cardiac and vascular events if discharged due to the following high risk characteristics:  Major criteria- Left Main

## 2020-05-07 ENCOUNTER — TRANSCRIPTION ENCOUNTER (OUTPATIENT)
Age: 65
End: 2020-05-07

## 2020-05-07 VITALS
TEMPERATURE: 98 F | SYSTOLIC BLOOD PRESSURE: 118 MMHG | OXYGEN SATURATION: 95 % | RESPIRATION RATE: 18 BRPM | HEART RATE: 72 BPM | DIASTOLIC BLOOD PRESSURE: 75 MMHG

## 2020-05-07 LAB
ANION GAP SERPL CALC-SCNC: 13 MMOL/L — SIGNIFICANT CHANGE UP (ref 5–17)
BUN SERPL-MCNC: 16 MG/DL — SIGNIFICANT CHANGE UP (ref 7–23)
CALCIUM SERPL-MCNC: 9.2 MG/DL — SIGNIFICANT CHANGE UP (ref 8.4–10.5)
CHLORIDE SERPL-SCNC: 104 MMOL/L — SIGNIFICANT CHANGE UP (ref 96–108)
CO2 SERPL-SCNC: 20 MMOL/L — LOW (ref 22–31)
CREAT SERPL-MCNC: 0.76 MG/DL — SIGNIFICANT CHANGE UP (ref 0.5–1.3)
GLUCOSE SERPL-MCNC: 121 MG/DL — HIGH (ref 70–99)
HCT VFR BLD CALC: 47.5 % — SIGNIFICANT CHANGE UP (ref 39–50)
HGB BLD-MCNC: 15 G/DL — SIGNIFICANT CHANGE UP (ref 13–17)
MCHC RBC-ENTMCNC: 28.9 PG — SIGNIFICANT CHANGE UP (ref 27–34)
MCHC RBC-ENTMCNC: 31.6 GM/DL — LOW (ref 32–36)
MCV RBC AUTO: 91.5 FL — SIGNIFICANT CHANGE UP (ref 80–100)
NRBC # BLD: 0 /100 WBCS — SIGNIFICANT CHANGE UP (ref 0–0)
PLATELET # BLD AUTO: 191 K/UL — SIGNIFICANT CHANGE UP (ref 150–400)
POTASSIUM SERPL-MCNC: 4.2 MMOL/L — SIGNIFICANT CHANGE UP (ref 3.5–5.3)
POTASSIUM SERPL-SCNC: 4.2 MMOL/L — SIGNIFICANT CHANGE UP (ref 3.5–5.3)
RBC # BLD: 5.19 M/UL — SIGNIFICANT CHANGE UP (ref 4.2–5.8)
RBC # FLD: 14 % — SIGNIFICANT CHANGE UP (ref 10.3–14.5)
SODIUM SERPL-SCNC: 137 MMOL/L — SIGNIFICANT CHANGE UP (ref 135–145)
WBC # BLD: 10.03 K/UL — SIGNIFICANT CHANGE UP (ref 3.8–10.5)
WBC # FLD AUTO: 10.03 K/UL — SIGNIFICANT CHANGE UP (ref 3.8–10.5)

## 2020-05-07 PROCEDURE — C1760: CPT

## 2020-05-07 PROCEDURE — C1887: CPT

## 2020-05-07 PROCEDURE — C1874: CPT

## 2020-05-07 PROCEDURE — 80048 BASIC METABOLIC PNL TOTAL CA: CPT

## 2020-05-07 PROCEDURE — 93005 ELECTROCARDIOGRAM TRACING: CPT

## 2020-05-07 PROCEDURE — 99152 MOD SED SAME PHYS/QHP 5/>YRS: CPT

## 2020-05-07 PROCEDURE — C1769: CPT

## 2020-05-07 PROCEDURE — C1725: CPT

## 2020-05-07 PROCEDURE — 85027 COMPLETE CBC AUTOMATED: CPT

## 2020-05-07 PROCEDURE — 99238 HOSP IP/OBS DSCHRG MGMT 30/<: CPT

## 2020-05-07 PROCEDURE — C1894: CPT

## 2020-05-07 PROCEDURE — C9600: CPT | Mod: LM

## 2020-05-07 PROCEDURE — 93010 ELECTROCARDIOGRAM REPORT: CPT

## 2020-05-07 PROCEDURE — 99153 MOD SED SAME PHYS/QHP EA: CPT

## 2020-05-07 RX ORDER — ASPIRIN/CALCIUM CARB/MAGNESIUM 324 MG
1 TABLET ORAL
Qty: 0 | Refills: 0 | DISCHARGE

## 2020-05-07 RX ORDER — CLOPIDOGREL BISULFATE 75 MG/1
1 TABLET, FILM COATED ORAL
Qty: 30 | Refills: 3
Start: 2020-05-07 | End: 2020-09-03

## 2020-05-07 RX ORDER — ASPIRIN/CALCIUM CARB/MAGNESIUM 324 MG
1 TABLET ORAL
Qty: 30 | Refills: 0
Start: 2020-05-07 | End: 2020-06-05

## 2020-05-07 RX ADMIN — Medication 50 MILLIGRAM(S): at 06:03

## 2020-05-07 RX ADMIN — Medication 325 MILLIGRAM(S): at 08:52

## 2020-05-07 RX ADMIN — LISINOPRIL 10 MILLIGRAM(S): 2.5 TABLET ORAL at 06:02

## 2020-05-07 RX ADMIN — CLOPIDOGREL BISULFATE 75 MILLIGRAM(S): 75 TABLET, FILM COATED ORAL at 08:52

## 2020-05-07 NOTE — DISCHARGE NOTE PROVIDER - NSDCACTIVITY_GEN_ALL_CORE
Showering allowed/Walking - Outdoors allowed/Stairs allowed/Walking - Indoors allowed/No heavy lifting/straining

## 2020-05-07 NOTE — DISCHARGE NOTE PROVIDER - NSDCMRMEDTOKEN_GEN_ALL_CORE_FT
aspirin 325 mg oral tablet: 1 tab(s) orally once a day  atorvastatin 40 mg oral tablet: 1 tab(s) orally once a day  clopidogrel 75 mg oral tablet: 1 tab(s) orally once a day  lisinopril 10 mg oral tablet: 1 tab(s) orally once a day  metoprolol succinate 50 mg oral tablet, extended release: 1 tab(s) orally once a day  Omega-3 oral capsule: orally once a day  Pepcid 20 mg oral tablet: 1 tab(s) orally once a day, As Needed  sertraline 25 mg oral tablet: 0.5 tab(s) orally once a day

## 2020-05-07 NOTE — DISCHARGE NOTE PROVIDER - NSDCCPCAREPLAN_GEN_ALL_CORE_FT
PRINCIPAL DISCHARGE DIAGNOSIS  Diagnosis: S/P drug eluting coronary stent placement  Assessment and Plan of Treatment: PRINCIPAL DISCHARGE DIAGNOSIS  Diagnosis: S/P drug eluting coronary stent placement  Assessment and Plan of Treatment: 1. Continue with your blood pressure medications; eat a heart healthy diet with low salt diet;   2. Exercise regularly (consult with your physician or cardiologist first); maintain a heart healthy weight; if you smoke - quit (A resource to help you stop smoking is the [facility] – phone number [phone].)  3. Include healthy ways to manage stress.   4. Continue to follow with your primary care physician or cardiologist.  5. Please follow up at [facility] for post op follow up with Cardiology located on [address], 1st floor.  [facility], [address]. Please call [phone] to schedule an appointment.   5. No heavy lifting, strenuous activity, bending, straining, or unnecessary stair climbing for 2 weeks.   6. No driving for 2 days.   7. You may shower 24 hours following the procedure but avoid baths/swimming for 1 week.   8. Check your groin site for bleeding and/or swelling daily following procedure and call your doctor immediately if it occurs or if you experience increased pain at the site.  Follow up with your cardiologist in 1-2 weeks.   9. You may call [facility] if you have any questions/concerns regarding your procedure [phone].

## 2020-05-07 NOTE — DISCHARGE NOTE PROVIDER - CARE PROVIDERS DIRECT ADDRESSES
,[email or web address],DirectAddress_Unknown ,[email or web address],DirectAddress_Unknown,[email or web address]

## 2020-05-07 NOTE — DISCHARGE NOTE NURSING/CASE MANAGEMENT/SOCIAL WORK - PATIENT PORTAL LINK FT
You can access the FollowMyHealth Patient Portal offered by [facility] by registering at the following website: [email or web address]. By joining [facility]’s FollowMyHealth portal, you will also be able to view your health information using other applications (apps) compatible with our system.

## 2020-05-07 NOTE — DISCHARGE NOTE PROVIDER - NSDCFUADDAPPT_GEN_ALL_CORE_FT
1. Please schedule an appointment with your Cardiologist in 1-2 weeks, please call the office to schedule an appointment.   2. Please schedule an appointment with your PCP in 1-2 weeks, call the office to schedule an appointment.

## 2020-05-07 NOTE — DISCHARGE NOTE PROVIDER - PROVIDER TOKENS
PROVIDER:[TOKEN:[[id number]],FOLLOWUP:[1 week]],FREE:[LAST:[Mayo],FIRST:[Brandan],PHONE:[phone]],FAX:[(   )    -],ADDRESS:[[address],   [place], [place] [address]],FOLLOWUP:[1 week]] PROVIDER:[TOKEN:[[id number]],FOLLOWUP:[1 week]],FREE:[LAST:[Fabregas],FIRST:[Brandan],PHONE:[phone]],FAX:[(   )    -],ADDRESS:[[address],   [address] [place], [place] [address]],FOLLOWUP:[1 week]],PROVIDER:[TOKEN:[[id number]],FOLLOWUP:[1 week]]

## 2020-05-07 NOTE — DISCHARGE NOTE PROVIDER - NSDCPNSUBOBJ_GEN_ALL_CORE
VITAL SIGNS        Subjective: Denies CP, palpitation, SOB, PRIETO, HA, dizziness, N/V/D, fever or chills.  No acute event noted overnight.         Telemetry: NSR 60-80          Vital Signs Last 24 Hrs    T(C): 36.5 (20 @ 05:42), Max: 36.7 (20 @ 20:08)    T(F): 97.7 (20 @ 05:42), Max: 98.1 (20 @ 20:08)    HR: 72 (20 @ 05:42) (67 - 72)    BP: 118/75 (20 @ 05:42) (117/78 - 132/79)    RR: 18 (20 @ 05:42) (16 - 18)    SpO2: 95% (20 @ 05:42) (95% - 98%)               07:01  -   @ 07:00    --------------------------------------------------------    IN: 110 mL / OUT: 0 mL / NET: 110 mL         @ 07:01  -   @ 08:28    --------------------------------------------------------    IN: 360 mL / OUT: 300 mL / NET: 60 mL        Daily Height in cm: 172.72 (06 May 2020 09:35)      Daily Weight in k.7 (06 May 2020 09:35)        PHYSICAL EXAM        Neurology: alert and oriented x 3, nonfocal, no gross deficits        CV: (+) S1 and S2, No murmurs, rubs, gallops or clicks         Lungs: CTA B/L         Abdomen: soft, nontender, nondistended, positive bowel sounds, (+) Flatus; (+) BM         :  Voiding                   Extremities:  B/L LE (-) edema; negative calf tenderness; (+) 2 DP palpable ; Right groin site C/D/I           aspirin 325 milliGRAM(s) Oral daily    atorvastatin 40 milliGRAM(s) Oral at bedtime    clopidogrel Tablet 75 milliGRAM(s) Oral daily    lisinopril 10 milliGRAM(s) Oral daily    metoprolol succinate ER 50 milliGRAM(s) Oral daily    sertraline 12.5 milliGRAM(s) Oral at bedtime        Discussed with Cardiothoracic Team at AM rounds.        Spent 30 min face to face encounter with patient and discharge note.

## 2020-05-07 NOTE — DISCHARGE NOTE PROVIDER - CARE PROVIDER_API CALL
Beau Camarena)  Cardiovascular Disease; Interventional Cardiology  [address] [place] [address]  Phone: [phone]  Fax: [phone]  Follow Up Time: 1 week    Brandan Huber  [facility]  [address],   [place] [address]  Phone: [phone]  Fax: (   )    -  Follow Up Time: 1 week Beau Camarena)  Cardiovascular Disease; Interventional Cardiology  [address] [place] [address]  Phone: [phone]  Fax: [phone]  Follow Up Time: 1 week    Brandan Huber  Family medicine  [address],   [place] [address]  Phone: [phone]  Fax: (   )    -  Follow Up Time: 1 week    Santiago Lynn)  Cardiology; Internal Medicine  [address] [facility], [address]  [place] [address]  Phone: [phone]  Fax: [phone]  Follow Up Time: 1 week

## 2020-05-27 ENCOUNTER — NON-APPOINTMENT (OUTPATIENT)
Age: 65
End: 2020-05-27

## 2020-05-27 ENCOUNTER — APPOINTMENT (OUTPATIENT)
Dept: CARDIOLOGY | Facility: CLINIC | Age: 65
End: 2020-05-27
Payer: COMMERCIAL

## 2020-05-27 VITALS
OXYGEN SATURATION: 98 % | HEIGHT: 68 IN | RESPIRATION RATE: 12 BRPM | DIASTOLIC BLOOD PRESSURE: 78 MMHG | BODY MASS INDEX: 30.31 KG/M2 | SYSTOLIC BLOOD PRESSURE: 125 MMHG | WEIGHT: 200 LBS | HEART RATE: 74 BPM

## 2020-05-27 PROCEDURE — 99214 OFFICE O/P EST MOD 30 MIN: CPT

## 2020-05-27 PROCEDURE — 93000 ELECTROCARDIOGRAM COMPLETE: CPT

## 2020-05-27 NOTE — REASON FOR VISIT
[Follow-Up - Clinic] : a clinic follow-up of [Coronary Artery Disease] : coronary artery disease [CABG Follow-up] : bypass graft [Hyperlipidemia] : hyperlipidemia [Medication Management] : Medication management [FreeTextEntry1] : Jeb underwent successful angioplasty and stent to the left main coronary by Dr. Camarena. He has had some atypical pains but overall is slowly improving. His groin is healing as well. He struggles with smoking but is trying to quit. he knows it is a difficult

## 2020-05-27 NOTE — ASSESSMENT
[FreeTextEntry1] : Quality measures \par Tobacco intervention smoking cessation advised given to patient\par Statin for prevention of cardiovascular disease atorvastatin\par Hypertension compensated\par Aspirin for ischemic vascular disease aspirin and Plavix\par Tobacco screening cessation and intervention positive screen\par \par Medical necessity\par This is a high encounter based upon two or more chronic illnesses with mild exacerbation requiring further management and evaluation.

## 2020-05-27 NOTE — DISCUSSION/SUMMARY
[Coronary Artery Disease] : coronary artery disease [Stable] : stable [Hyperlipidemia] : hyperlipidemia [Responding to Treatment] : responding to treatment

## 2020-05-27 NOTE — PHYSICAL EXAM
[Normal Appearance] : normal appearance [General Appearance - Well Developed] : well developed [Well Groomed] : well groomed [No Deformities] : no deformities [General Appearance - Well Nourished] : well nourished [General Appearance - In No Acute Distress] : no acute distress [Normal Conjunctiva] : the conjunctiva exhibited no abnormalities [Eyelids - No Xanthelasma] : the eyelids demonstrated no xanthelasmas [No Oral Cyanosis] : no oral cyanosis [No Oral Pallor] : no oral pallor [Normal Oral Mucosa] : normal oral mucosa [Normal Jugular Venous A Waves Present] : normal jugular venous A waves present [Normal Jugular Venous V Waves Present] : normal jugular venous V waves present [Respiration, Rhythm And Depth] : normal respiratory rhythm and effort [No Jugular Venous Walker A Waves] : no jugular venous walker A waves [Heart Rate And Rhythm] : heart rate and rhythm were normal [Exaggerated Use Of Accessory Muscles For Inspiration] : no accessory muscle use [Auscultation Breath Sounds / Voice Sounds] : lungs were clear to auscultation bilaterally [Abdomen Soft] : soft [Heart Sounds] : normal S1 and S2 [Murmurs] : no murmurs present [Abdomen Mass (___ Cm)] : no abdominal mass palpated [Abdomen Tenderness] : non-tender [Abnormal Walk] : normal gait [Gait - Sufficient For Exercise Testing] : the gait was sufficient for exercise testing [Nail Clubbing] : no clubbing of the fingernails [Petechial Hemorrhages (___cm)] : no petechial hemorrhages [Cyanosis, Localized] : no localized cyanosis [Skin Color & Pigmentation] : normal skin color and pigmentation [] : no ischemic changes [Skin Lesions] : no skin lesions [No Venous Stasis] : no venous stasis [No Xanthoma] : no  xanthoma was observed [No Skin Ulcers] : no skin ulcer [Oriented To Time, Place, And Person] : oriented to person, place, and time [Affect] : the affect was normal [Mood] : the mood was normal [FreeTextEntry1] : Has a support group for anxiety

## 2020-07-30 ENCOUNTER — RX RENEWAL (OUTPATIENT)
Age: 65
End: 2020-07-30

## 2020-08-19 ENCOUNTER — NON-APPOINTMENT (OUTPATIENT)
Age: 65
End: 2020-08-19

## 2020-08-19 ENCOUNTER — APPOINTMENT (OUTPATIENT)
Dept: CARDIOLOGY | Facility: CLINIC | Age: 65
End: 2020-08-19
Payer: COMMERCIAL

## 2020-08-19 VITALS
RESPIRATION RATE: 12 BRPM | DIASTOLIC BLOOD PRESSURE: 84 MMHG | HEIGHT: 68 IN | OXYGEN SATURATION: 98 % | HEART RATE: 61 BPM | BODY MASS INDEX: 31.52 KG/M2 | SYSTOLIC BLOOD PRESSURE: 138 MMHG | TEMPERATURE: 98.2 F | WEIGHT: 208 LBS

## 2020-08-19 DIAGNOSIS — R07.9 CHEST PAIN, UNSPECIFIED: ICD-10-CM

## 2020-08-19 DIAGNOSIS — R20.0 ANESTHESIA OF SKIN: ICD-10-CM

## 2020-08-19 DIAGNOSIS — I65.29 OCCLUSION AND STENOSIS OF UNSPECIFIED CAROTID ARTERY: ICD-10-CM

## 2020-08-19 PROCEDURE — 99214 OFFICE O/P EST MOD 30 MIN: CPT

## 2020-08-19 PROCEDURE — 93000 ELECTROCARDIOGRAM COMPLETE: CPT

## 2020-08-23 NOTE — ASSESSMENT
[FreeTextEntry1] : cervical spine files\par Impression mild narrowing of the inter-vertebral disc spaces at C-5 6 C67 representing mild disc degeneration at these two levels no instability noted cervical disc degeneration\par \par most likely pains are related to cervical spine disease. would continue to follow clinically. \par \par Quality measures \par Tobacco intervention unable to quit nictotine patch ordered\par Statin for prevention of cardiovascular disease  atorva 80\par Hypertension \par Aspirin for ischemic vascular disease  325\par Tobacco screening cessation and intervention smoker\par \par Medical necessity\par This is a high encounter based upon two or more chronic illnesses with mild exacerbation requiring further management and evaluation.   .

## 2020-08-23 NOTE — REASON FOR VISIT
[FreeTextEntry1] : Dr Malone put philip on Zoloft. he lost broth to CVA and mom covid. no covid symptoms . he  tested negative twice before the stent exposed to people that had. it. he had some numbness of left hand and neck. may be cervical. eval for reflux dr estefania Salas. GI.

## 2020-10-13 ENCOUNTER — APPOINTMENT (OUTPATIENT)
Dept: CARDIOLOGY | Facility: CLINIC | Age: 65
End: 2020-10-13
Payer: COMMERCIAL

## 2020-10-13 PROCEDURE — 93880 EXTRACRANIAL BILAT STUDY: CPT

## 2020-10-28 ENCOUNTER — RX RENEWAL (OUTPATIENT)
Age: 65
End: 2020-10-28

## 2020-11-11 ENCOUNTER — APPOINTMENT (OUTPATIENT)
Dept: CARDIOLOGY | Facility: CLINIC | Age: 65
End: 2020-11-11
Payer: COMMERCIAL

## 2020-11-11 ENCOUNTER — NON-APPOINTMENT (OUTPATIENT)
Age: 65
End: 2020-11-11

## 2020-11-11 VITALS
TEMPERATURE: 98.3 F | SYSTOLIC BLOOD PRESSURE: 111 MMHG | OXYGEN SATURATION: 97 % | DIASTOLIC BLOOD PRESSURE: 74 MMHG | WEIGHT: 210 LBS | RESPIRATION RATE: 12 BRPM | BODY MASS INDEX: 31.83 KG/M2 | HEIGHT: 68 IN | HEART RATE: 67 BPM

## 2020-11-11 PROCEDURE — 93000 ELECTROCARDIOGRAM COMPLETE: CPT

## 2020-11-11 PROCEDURE — 99213 OFFICE O/P EST LOW 20 MIN: CPT

## 2020-11-22 NOTE — DISCUSSION/SUMMARY
[Coronary Artery Disease] : coronary artery disease [Stable] : stable [Responding to Treatment] : responding to treatment [None] : none

## 2021-01-27 NOTE — H&P CARDIOLOGY - AS BP NONINV METHOD
Pt is here today for labs after MD visit.  Eleazar labs peripheral (one stick- right antecubital with a 21g butterfly needle) without complications. Needle removed and pressure dressing applied. Lab visit documented for 8 minutes.    
electronic

## 2021-01-30 ENCOUNTER — RX RENEWAL (OUTPATIENT)
Age: 66
End: 2021-01-30

## 2021-03-31 ENCOUNTER — RX RENEWAL (OUTPATIENT)
Age: 66
End: 2021-03-31

## 2021-04-02 ENCOUNTER — APPOINTMENT (OUTPATIENT)
Dept: CARDIOLOGY | Facility: CLINIC | Age: 66
End: 2021-04-02

## 2021-05-06 ENCOUNTER — RX RENEWAL (OUTPATIENT)
Age: 66
End: 2021-05-06

## 2021-06-23 ENCOUNTER — APPOINTMENT (OUTPATIENT)
Dept: CARDIOLOGY | Facility: CLINIC | Age: 66
End: 2021-06-23
Payer: COMMERCIAL

## 2021-06-23 ENCOUNTER — NON-APPOINTMENT (OUTPATIENT)
Age: 66
End: 2021-06-23

## 2021-06-23 VITALS
HEIGHT: 68 IN | WEIGHT: 207 LBS | DIASTOLIC BLOOD PRESSURE: 80 MMHG | SYSTOLIC BLOOD PRESSURE: 136 MMHG | RESPIRATION RATE: 12 BRPM | TEMPERATURE: 97.6 F | BODY MASS INDEX: 31.37 KG/M2 | OXYGEN SATURATION: 98 % | HEART RATE: 60 BPM

## 2021-06-23 PROCEDURE — 99072 ADDL SUPL MATRL&STAF TM PHE: CPT

## 2021-06-23 PROCEDURE — 99214 OFFICE O/P EST MOD 30 MIN: CPT

## 2021-06-23 PROCEDURE — 93000 ELECTROCARDIOGRAM COMPLETE: CPT

## 2021-06-23 RX ORDER — CLOPIDOGREL BISULFATE 75 MG/1
75 TABLET, FILM COATED ORAL
Qty: 90 | Refills: 1 | Status: DISCONTINUED | COMMUNITY
Start: 2020-09-17 | End: 2021-06-23

## 2021-06-30 NOTE — PHYSICAL EXAM
Patient/EMS [Well Developed] : well developed [Well Nourished] : well nourished [No Acute Distress] : no acute distress [No Carotid Bruit] : no carotid bruit [Normal Venous Pressure] : normal venous pressure [Normal S1, S2] : normal S1, S2 [No Murmur] : no murmur [No Rub] : no rub [No Gallop] : no gallop [Clear Lung Fields] : clear lung fields [No Respiratory Distress] : no respiratory distress  [Good Air Entry] : good air entry [Soft] : abdomen soft [Non Tender] : non-tender [No Masses/organomegaly] : no masses/organomegaly [Normal Bowel Sounds] : normal bowel sounds [Normal Gait] : normal gait [No Edema] : no edema [No Cyanosis] : no cyanosis [No Clubbing] : no clubbing [No Varicosities] : no varicosities [No Skin Lesions] : no skin lesions [No Rash] : no rash [Moves all extremities] : moves all extremities [No Focal Deficits] : no focal deficits [Normal Speech] : normal speech [Normal memory] : normal memory [Alert and Oriented] : alert and oriented [Normal Appearance] : normal appearance [General Appearance - Well Developed] : well developed [Well Groomed] : well groomed [General Appearance - Well Nourished] : well nourished [No Deformities] : no deformities [Normal Conjunctiva] : the conjunctiva exhibited no abnormalities [General Appearance - In No Acute Distress] : no acute distress [Eyelids - No Xanthelasma] : the eyelids demonstrated no xanthelasmas [Normal Oral Mucosa] : normal oral mucosa [No Oral Pallor] : no oral pallor [No Oral Cyanosis] : no oral cyanosis [Normal Jugular Venous A Waves Present] : normal jugular venous A waves present [Normal Jugular Venous V Waves Present] : normal jugular venous V waves present [No Jugular Venous Walker A Waves] : no jugular venous walker A waves [Respiration, Rhythm And Depth] : normal respiratory rhythm and effort [Auscultation Breath Sounds / Voice Sounds] : lungs were clear to auscultation bilaterally [Exaggerated Use Of Accessory Muscles For Inspiration] : no accessory muscle use [Heart Rate And Rhythm] : heart rate and rhythm were normal [Heart Sounds] : normal S1 and S2 [Murmurs] : no murmurs present [Abdomen Soft] : soft [Abdomen Tenderness] : non-tender [Abdomen Mass (___ Cm)] : no abdominal mass palpated [Abnormal Walk] : normal gait [Gait - Sufficient For Exercise Testing] : the gait was sufficient for exercise testing [Cyanosis, Localized] : no localized cyanosis [Nail Clubbing] : no clubbing of the fingernails [Petechial Hemorrhages (___cm)] : no petechial hemorrhages [Skin Color & Pigmentation] : normal skin color and pigmentation [] : no rash [No Venous Stasis] : no venous stasis [No Skin Ulcers] : no skin ulcer [Skin Lesions] : no skin lesions [No Xanthoma] : no  xanthoma was observed [Oriented To Time, Place, And Person] : oriented to person, place, and time [Mood] : the mood was normal [Affect] : the affect was normal [FreeTextEntry1] : Has a support group for anxiety

## 2021-06-30 NOTE — DISCUSSION/SUMMARY
[Coronary Artery Disease] : coronary artery disease [Stable] : stable [Responding to Treatment] : responding to treatment [Hyperlipidemia] : hyperlipidemia [Not Responding to Treatment] : not responding to treatment [Lipids Test Panel] : a fasting lipid profile [Known CAD] : known coronary artery disease [Smoking] : smoking [de-identified] : triglycerides 600 consider repeat. message left for philip

## 2021-06-30 NOTE — REASON FOR VISIT
[FreeTextEntry1] : no vaccine no reason per se ? recent  allergy.  we note triglycerides of 600 and wonder if this is a fasint sample. We will discontinue clopidogrel one year after a cardiac stent repeat lipids requested

## 2021-08-12 ENCOUNTER — RX RENEWAL (OUTPATIENT)
Age: 66
End: 2021-08-12

## 2021-10-13 ENCOUNTER — APPOINTMENT (OUTPATIENT)
Dept: CARDIOLOGY | Facility: CLINIC | Age: 66
End: 2021-10-13

## 2021-11-05 ENCOUNTER — NON-APPOINTMENT (OUTPATIENT)
Age: 66
End: 2021-11-05

## 2021-11-05 ENCOUNTER — APPOINTMENT (OUTPATIENT)
Dept: CARDIOLOGY | Facility: CLINIC | Age: 66
End: 2021-11-05
Payer: COMMERCIAL

## 2021-11-05 VITALS
TEMPERATURE: 97.6 F | RESPIRATION RATE: 16 BRPM | SYSTOLIC BLOOD PRESSURE: 152 MMHG | BODY MASS INDEX: 31.37 KG/M2 | WEIGHT: 207 LBS | HEART RATE: 67 BPM | DIASTOLIC BLOOD PRESSURE: 81 MMHG | HEIGHT: 68 IN | OXYGEN SATURATION: 99 %

## 2021-11-05 DIAGNOSIS — R06.02 SHORTNESS OF BREATH: ICD-10-CM

## 2021-11-05 DIAGNOSIS — E78.5 HYPERLIPIDEMIA, UNSPECIFIED: ICD-10-CM

## 2021-11-05 PROCEDURE — 93000 ELECTROCARDIOGRAM COMPLETE: CPT

## 2021-11-05 PROCEDURE — 99213 OFFICE O/P EST LOW 20 MIN: CPT

## 2021-11-08 NOTE — PHYSICAL EXAM
[Well Developed] : well developed [Well Nourished] : well nourished [No Acute Distress] : no acute distress [Normal Venous Pressure] : normal venous pressure [No Carotid Bruit] : no carotid bruit [Normal S1, S2] : normal S1, S2 [No Murmur] : no murmur [No Rub] : no rub [No Gallop] : no gallop [Clear Lung Fields] : clear lung fields [Good Air Entry] : good air entry [No Respiratory Distress] : no respiratory distress  [Soft] : abdomen soft [Non Tender] : non-tender [No Masses/organomegaly] : no masses/organomegaly [Normal Bowel Sounds] : normal bowel sounds [Normal Gait] : normal gait [No Edema] : no edema [No Cyanosis] : no cyanosis [No Clubbing] : no clubbing [No Varicosities] : no varicosities [No Rash] : no rash [No Skin Lesions] : no skin lesions [Moves all extremities] : moves all extremities [No Focal Deficits] : no focal deficits [Normal Speech] : normal speech [Alert and Oriented] : alert and oriented [Normal memory] : normal memory [General Appearance - Well Developed] : well developed [Normal Appearance] : normal appearance [Well Groomed] : well groomed [General Appearance - Well Nourished] : well nourished [No Deformities] : no deformities [General Appearance - In No Acute Distress] : no acute distress [Normal Conjunctiva] : the conjunctiva exhibited no abnormalities [Eyelids - No Xanthelasma] : the eyelids demonstrated no xanthelasmas [Normal Oral Mucosa] : normal oral mucosa [No Oral Pallor] : no oral pallor [No Oral Cyanosis] : no oral cyanosis [Normal Jugular Venous A Waves Present] : normal jugular venous A waves present [Normal Jugular Venous V Waves Present] : normal jugular venous V waves present [No Jugular Venous Walker A Waves] : no jugular venous walker A waves [Respiration, Rhythm And Depth] : normal respiratory rhythm and effort [Exaggerated Use Of Accessory Muscles For Inspiration] : no accessory muscle use [Auscultation Breath Sounds / Voice Sounds] : lungs were clear to auscultation bilaterally [Heart Rate And Rhythm] : heart rate and rhythm were normal [Heart Sounds] : normal S1 and S2 [Murmurs] : no murmurs present [Abdomen Soft] : soft [Abdomen Tenderness] : non-tender [Abdomen Mass (___ Cm)] : no abdominal mass palpated [Abnormal Walk] : normal gait [Gait - Sufficient For Exercise Testing] : the gait was sufficient for exercise testing [Nail Clubbing] : no clubbing of the fingernails [Cyanosis, Localized] : no localized cyanosis [Petechial Hemorrhages (___cm)] : no petechial hemorrhages [Skin Color & Pigmentation] : normal skin color and pigmentation [] : no rash [No Venous Stasis] : no venous stasis [Skin Lesions] : no skin lesions [No Skin Ulcers] : no skin ulcer [No Xanthoma] : no  xanthoma was observed [Oriented To Time, Place, And Person] : oriented to person, place, and time [Affect] : the affect was normal [Mood] : the mood was normal [FreeTextEntry1] : Has a support group for anxiety

## 2021-11-08 NOTE — ASSESSMENT
[FreeTextEntry1] : there's an intermediate risk encounter based on two more chronic illnesses requiring updating of cardiovascular risk in the form of blood work and stress testing \par \par \par EKG indicated for coronary artery disease

## 2021-11-08 NOTE — REASON FOR VISIT
[FreeTextEntry1] : no vaccine no reason per se ? recent  allergy.  we note triglycerides of 600 and wonder if this is a fasint sample. We will discontinue clopidogrel one year after a cardiac stent repeat lipids requested. periodic stress testing advised

## 2021-11-08 NOTE — DISCUSSION/SUMMARY
[Coronary Artery Disease] : coronary artery disease [Stable] : stable [Responding to Treatment] : responding to treatment [Hyperlipidemia] : hyperlipidemia [Not Responding to Treatment] : not responding to treatment [Lipids Test Panel] : a fasting lipid profile [Known CAD] : known coronary artery disease [Smoking] : smoking [de-identified] : triglycerides 600 consider repeat. message left for philip

## 2021-11-20 ENCOUNTER — RX RENEWAL (OUTPATIENT)
Age: 66
End: 2021-11-20

## 2022-01-06 ENCOUNTER — TRANSCRIPTION ENCOUNTER (OUTPATIENT)
Age: 67
End: 2022-01-06

## 2022-03-02 ENCOUNTER — RX RENEWAL (OUTPATIENT)
Age: 67
End: 2022-03-02

## 2022-05-11 ENCOUNTER — APPOINTMENT (OUTPATIENT)
Dept: CARDIOLOGY | Facility: CLINIC | Age: 67
End: 2022-05-11

## 2022-05-11 ENCOUNTER — APPOINTMENT (OUTPATIENT)
Dept: CARDIOLOGY | Facility: CLINIC | Age: 67
End: 2022-05-11
Payer: COMMERCIAL

## 2022-05-11 PROCEDURE — 93015 CV STRESS TEST SUPVJ I&R: CPT

## 2022-06-03 ENCOUNTER — RX RENEWAL (OUTPATIENT)
Age: 67
End: 2022-06-03

## 2022-06-15 ENCOUNTER — RX RENEWAL (OUTPATIENT)
Age: 67
End: 2022-06-15

## 2022-08-10 ENCOUNTER — APPOINTMENT (OUTPATIENT)
Dept: CARDIOLOGY | Facility: CLINIC | Age: 67
End: 2022-08-10

## 2022-09-06 ENCOUNTER — APPOINTMENT (OUTPATIENT)
Dept: CARDIOLOGY | Facility: CLINIC | Age: 67
End: 2022-09-06

## 2022-09-21 ENCOUNTER — RX RENEWAL (OUTPATIENT)
Age: 67
End: 2022-09-21

## 2022-09-30 ENCOUNTER — RX RENEWAL (OUTPATIENT)
Age: 67
End: 2022-09-30

## 2022-12-02 ENCOUNTER — RX RENEWAL (OUTPATIENT)
Age: 67
End: 2022-12-02

## 2023-04-19 ENCOUNTER — NON-APPOINTMENT (OUTPATIENT)
Age: 68
End: 2023-04-19

## 2023-04-19 ENCOUNTER — APPOINTMENT (OUTPATIENT)
Dept: CARDIOLOGY | Facility: CLINIC | Age: 68
End: 2023-04-19
Payer: MEDICARE

## 2023-04-19 VITALS
TEMPERATURE: 97.3 F | DIASTOLIC BLOOD PRESSURE: 73 MMHG | WEIGHT: 202 LBS | BODY MASS INDEX: 30.62 KG/M2 | RESPIRATION RATE: 17 BRPM | SYSTOLIC BLOOD PRESSURE: 130 MMHG | HEART RATE: 61 BPM | HEIGHT: 68 IN | OXYGEN SATURATION: 96 %

## 2023-04-19 DIAGNOSIS — Z00.00 ENCOUNTER FOR GENERAL ADULT MEDICAL EXAMINATION W/OUT ABNORMAL FINDINGS: ICD-10-CM

## 2023-04-19 PROCEDURE — 99214 OFFICE O/P EST MOD 30 MIN: CPT

## 2023-04-19 PROCEDURE — 93000 ELECTROCARDIOGRAM COMPLETE: CPT

## 2023-04-24 NOTE — DISCUSSION/SUMMARY
[Coronary Artery Disease] : coronary artery disease [Stable] : stable [Responding to Treatment] : responding to treatment [Hyperlipidemia] : hyperlipidemia [Not Responding to Treatment] : not responding to treatment [Lipids Test Panel] : a fasting lipid profile [Known CAD] : known coronary artery disease [Smoking] : smoking [EKG obtained to assist in diagnosis and management of assessed problem(s)] : EKG obtained to assist in diagnosis and management of assessed problem(s) [de-identified] : triglycerides 600 consider repeat. message left for philip

## 2023-04-24 NOTE — REASON FOR VISIT
[FreeTextEntry1] : no vaccine no reason per se ? recent  allergy.  we note triglycerides of 600 and wonder if this is a fasting sample. We will discontinue clopidogrel one year after a cardiac stent repeat lipids requested. periodic stress testing advised\par \par 4/219/23\par philip was undergoing chiropractic care and lumbi Xray reported a right upper quadrant calcification measure 1.5 cm  RUQ sono was recommended.

## 2023-04-24 NOTE — ASSESSMENT
[FreeTextEntry1] : \par \par 4/19/23in view  of advanced cad, a nuclear stress in addition to RUQ sono and fasting lipids were recommended.\par \par \par EKG indicated for coronary artery disease\par \par medical necessity\par this is high risk encounter based upon a vasculopathy with calcification int hr ruq.

## 2023-05-09 ENCOUNTER — APPOINTMENT (OUTPATIENT)
Dept: CARDIOLOGY | Facility: CLINIC | Age: 68
End: 2023-05-09
Payer: MEDICARE

## 2023-05-09 PROCEDURE — 78452 HT MUSCLE IMAGE SPECT MULT: CPT

## 2023-05-09 PROCEDURE — A9500: CPT

## 2023-05-09 PROCEDURE — 93015 CV STRESS TEST SUPVJ I&R: CPT

## 2023-05-11 ENCOUNTER — NON-APPOINTMENT (OUTPATIENT)
Age: 68
End: 2023-05-11

## 2023-06-10 ENCOUNTER — RX RENEWAL (OUTPATIENT)
Age: 68
End: 2023-06-10

## 2023-09-16 ENCOUNTER — RX RENEWAL (OUTPATIENT)
Age: 68
End: 2023-09-16

## 2023-11-25 ENCOUNTER — RX RENEWAL (OUTPATIENT)
Age: 68
End: 2023-11-25

## 2024-03-09 ENCOUNTER — RX RENEWAL (OUTPATIENT)
Age: 69
End: 2024-03-09

## 2024-04-17 ENCOUNTER — APPOINTMENT (OUTPATIENT)
Dept: CARDIOLOGY | Facility: CLINIC | Age: 69
End: 2024-04-17
Payer: MEDICARE

## 2024-04-17 ENCOUNTER — NON-APPOINTMENT (OUTPATIENT)
Age: 69
End: 2024-04-17

## 2024-04-17 VITALS
SYSTOLIC BLOOD PRESSURE: 118 MMHG | OXYGEN SATURATION: 97 % | WEIGHT: 200 LBS | HEIGHT: 68 IN | HEART RATE: 63 BPM | BODY MASS INDEX: 30.31 KG/M2 | DIASTOLIC BLOOD PRESSURE: 73 MMHG

## 2024-04-17 PROCEDURE — 99214 OFFICE O/P EST MOD 30 MIN: CPT

## 2024-04-17 PROCEDURE — 93000 ELECTROCARDIOGRAM COMPLETE: CPT

## 2024-04-17 NOTE — DISCUSSION/SUMMARY
[Coronary Artery Disease] : coronary artery disease [Stable] : stable [Responding to Treatment] : responding to treatment [Hyperlipidemia] : hyperlipidemia [Not Responding to Treatment] : not responding to treatment [Lipids Test Panel] : a fasting lipid profile [Known CAD] : known coronary artery disease [Smoking] : smoking [EKG obtained to assist in diagnosis and management of assessed problem(s)] : EKG obtained to assist in diagnosis and management of assessed problem(s) [de-identified] : triglycerides 600 consider repeat. message left for philip

## 2024-04-17 NOTE — ASSESSMENT
[FreeTextEntry1] : 4/19/23in view  of advanced cad, a nuclear stress in addition to RUQ sono and fasting lipids were recommended.  4/17/2024 Would consider for careful clinical follow-up given recent nuclear stress test is negative and EKG is unchanged and symptoms are atypical for angina. 1 month follow-up .report symptoms. fasting lipids continue aspirin for prior cardiac stent   EKG indicated for coronary artery disease  medical necessity this is high risk encounter based upon recurrent atypical symptoms in the setting of known advanced coronary artery disease

## 2024-04-17 NOTE — REASON FOR VISIT
[FreeTextEntry1] : no vaccine no reason per se ? recent  allergy.  we note triglycerides of 600 and wonder if this is a fasting sample. We will discontinue clopidogrel one year after a cardiac stent repeat lipids requested. periodic stress testing advised  4/219/23 jeb was undergoing chiropractic care and lumbi Xray reported a right upper quadrant calcification measure 1.5 cm RUQ sono was recommended.  4/17/2024 Jeb has had some recent shoulder pains dental pains jaw pain extractions are being considered he wonders whether or not this could be cardiac in origin he also tells me he has got a known rotator cuff on his right shoulder and probably has 1 on the left with a torn ligament he feels like he is getting older the symptoms are Non exertional recent nuclear stress test was negative for ischemia.  Possible recent sinus infection underwent a chest x-ray with Dr. Huber results pending.

## 2024-04-17 NOTE — CARDIOLOGY SUMMARY
[___] : [unfilled] [LVEF ___%] : LVEF [unfilled]% [None] : normal LV function [de-identified] : 4/17/2023 normal sinus rhythm right bundle branch block without change [de-identified] : 5/9/2023.  Normal myocardial perfusion image

## 2024-04-17 NOTE — PHYSICAL EXAM
[Well Developed] : well developed [Well Nourished] : well nourished [No Acute Distress] : no acute distress [Normal Venous Pressure] : normal venous pressure [No Carotid Bruit] : no carotid bruit [Normal S1, S2] : normal S1, S2 [No Murmur] : no murmur [No Rub] : no rub [No Gallop] : no gallop [Clear Lung Fields] : clear lung fields [Good Air Entry] : good air entry [No Respiratory Distress] : no respiratory distress  [Soft] : abdomen soft [Non Tender] : non-tender [No Masses/organomegaly] : no masses/organomegaly [Normal Bowel Sounds] : normal bowel sounds [Normal Gait] : normal gait [No Edema] : no edema [No Cyanosis] : no cyanosis [No Clubbing] : no clubbing [No Varicosities] : no varicosities [No Rash] : no rash [No Skin Lesions] : no skin lesions [Moves all extremities] : moves all extremities [No Focal Deficits] : no focal deficits [Normal Speech] : normal speech [Alert and Oriented] : alert and oriented [Normal memory] : normal memory [General Appearance - Well Developed] : well developed [Normal Appearance] : normal appearance [Well Groomed] : well groomed [General Appearance - Well Nourished] : well nourished [No Deformities] : no deformities [General Appearance - In No Acute Distress] : no acute distress [Normal Conjunctiva] : the conjunctiva exhibited no abnormalities [Eyelids - No Xanthelasma] : the eyelids demonstrated no xanthelasmas [Normal Oral Mucosa] : normal oral mucosa [No Oral Pallor] : no oral pallor [No Oral Cyanosis] : no oral cyanosis [Normal Jugular Venous A Waves Present] : normal jugular venous A waves present [Normal Jugular Venous V Waves Present] : normal jugular venous V waves present [No Jugular Venous Walker A Waves] : no jugular venous wlaker A waves [Respiration, Rhythm And Depth] : normal respiratory rhythm and effort [Exaggerated Use Of Accessory Muscles For Inspiration] : no accessory muscle use [Auscultation Breath Sounds / Voice Sounds] : lungs were clear to auscultation bilaterally [Heart Rate And Rhythm] : heart rate and rhythm were normal [Heart Sounds] : normal S1 and S2 [Murmurs] : no murmurs present [Abdomen Soft] : soft [Abdomen Tenderness] : non-tender [Abdomen Mass (___ Cm)] : no abdominal mass palpated [Abnormal Walk] : normal gait [Gait - Sufficient For Exercise Testing] : the gait was sufficient for exercise testing [Nail Clubbing] : no clubbing of the fingernails [Cyanosis, Localized] : no localized cyanosis [Petechial Hemorrhages (___cm)] : no petechial hemorrhages [Skin Color & Pigmentation] : normal skin color and pigmentation [] : no rash [No Venous Stasis] : no venous stasis [Skin Lesions] : no skin lesions [No Skin Ulcers] : no skin ulcer [No Xanthoma] : no  xanthoma was observed [Oriented To Time, Place, And Person] : oriented to person, place, and time [Affect] : the affect was normal [Mood] : the mood was normal [FreeTextEntry1] : Has a support group for anxiety

## 2024-05-22 ENCOUNTER — RX RENEWAL (OUTPATIENT)
Age: 69
End: 2024-05-22

## 2024-06-06 ENCOUNTER — RX RENEWAL (OUTPATIENT)
Age: 69
End: 2024-06-06

## 2024-06-11 ENCOUNTER — NON-APPOINTMENT (OUTPATIENT)
Age: 69
End: 2024-06-11

## 2024-06-11 ENCOUNTER — APPOINTMENT (OUTPATIENT)
Dept: CARDIOLOGY | Facility: CLINIC | Age: 69
End: 2024-06-11
Payer: MEDICARE

## 2024-06-11 VITALS
HEIGHT: 68 IN | OXYGEN SATURATION: 97 % | BODY MASS INDEX: 31.37 KG/M2 | WEIGHT: 207 LBS | DIASTOLIC BLOOD PRESSURE: 81 MMHG | HEART RATE: 61 BPM | SYSTOLIC BLOOD PRESSURE: 133 MMHG

## 2024-06-11 DIAGNOSIS — I70.0 ATHEROSCLEROSIS OF AORTA: ICD-10-CM

## 2024-06-11 DIAGNOSIS — I25.10 ATHEROSCLEROTIC HEART DISEASE OF NATIVE CORONARY ARTERY W/OUT ANGINA PECTORIS: ICD-10-CM

## 2024-06-11 PROCEDURE — 93000 ELECTROCARDIOGRAM COMPLETE: CPT

## 2024-06-11 PROCEDURE — 99214 OFFICE O/P EST MOD 30 MIN: CPT

## 2024-06-11 NOTE — CARDIOLOGY SUMMARY
[___] : [unfilled] [LVEF ___%] : LVEF [unfilled]% [None] : normal LV function [de-identified] : 4/17/2023 normal sinus rhythm right bundle branch block without change [de-identified] : 5/9/2023.  Normal myocardial perfusion image

## 2024-06-11 NOTE — DISCUSSION/SUMMARY
[Coronary Artery Disease] : coronary artery disease [Stable] : stable [Responding to Treatment] : responding to treatment [Hyperlipidemia] : hyperlipidemia [Not Responding to Treatment] : not responding to treatment [Lipids Test Panel] : a fasting lipid profile [Known CAD] : known coronary artery disease [Smoking] : smoking [de-identified] : triglycerides 600 consider repeat. message left for philip [EKG obtained to assist in diagnosis and management of assessed problem(s)] : EKG obtained to assist in diagnosis and management of assessed problem(s)

## 2024-06-11 NOTE — PHYSICAL EXAM
[Well Developed] : well developed [Well Nourished] : well nourished [No Acute Distress] : no acute distress [Normal Venous Pressure] : normal venous pressure [No Carotid Bruit] : no carotid bruit [Normal S1, S2] : normal S1, S2 [No Murmur] : no murmur [No Rub] : no rub [No Gallop] : no gallop [Clear Lung Fields] : clear lung fields [Good Air Entry] : good air entry [No Respiratory Distress] : no respiratory distress  [Soft] : abdomen soft [Non Tender] : non-tender [No Masses/organomegaly] : no masses/organomegaly [Normal Bowel Sounds] : normal bowel sounds [Normal Gait] : normal gait [No Edema] : no edema [No Cyanosis] : no cyanosis [No Clubbing] : no clubbing [No Varicosities] : no varicosities [No Rash] : no rash [No Skin Lesions] : no skin lesions [Moves all extremities] : moves all extremities [No Focal Deficits] : no focal deficits [Normal Speech] : normal speech [Alert and Oriented] : alert and oriented [Normal memory] : normal memory [General Appearance - Well Developed] : well developed [Normal Appearance] : normal appearance [Well Groomed] : well groomed [General Appearance - Well Nourished] : well nourished [No Deformities] : no deformities [General Appearance - In No Acute Distress] : no acute distress [Normal Conjunctiva] : the conjunctiva exhibited no abnormalities [Eyelids - No Xanthelasma] : the eyelids demonstrated no xanthelasmas [Normal Oral Mucosa] : normal oral mucosa [No Oral Pallor] : no oral pallor [No Oral Cyanosis] : no oral cyanosis [Normal Jugular Venous A Waves Present] : normal jugular venous A waves present [Normal Jugular Venous V Waves Present] : normal jugular venous V waves present [No Jugular Venous Walker A Waves] : no jugular venous walker A waves [Respiration, Rhythm And Depth] : normal respiratory rhythm and effort [Exaggerated Use Of Accessory Muscles For Inspiration] : no accessory muscle use [Auscultation Breath Sounds / Voice Sounds] : lungs were clear to auscultation bilaterally [Heart Rate And Rhythm] : heart rate and rhythm were normal [Murmurs] : no murmurs present [Abdomen Soft] : soft [Abdomen Tenderness] : non-tender [Abdomen Mass (___ Cm)] : no abdominal mass palpated [Abnormal Walk] : normal gait [Gait - Sufficient For Exercise Testing] : the gait was sufficient for exercise testing [Nail Clubbing] : no clubbing of the fingernails [Cyanosis, Localized] : no localized cyanosis [Petechial Hemorrhages (___cm)] : no petechial hemorrhages [Skin Color & Pigmentation] : normal skin color and pigmentation [] : no rash [No Venous Stasis] : no venous stasis [Skin Lesions] : no skin lesions [No Skin Ulcers] : no skin ulcer [No Xanthoma] : no  xanthoma was observed [Oriented To Time, Place, And Person] : oriented to person, place, and time [Affect] : the affect was normal [Mood] : the mood was normal [FreeTextEntry1] : Has a support group for anxiety

## 2024-06-11 NOTE — ASSESSMENT
[FreeTextEntry1] : 4/19/23in view  of advanced cad, a nuclear stress in addition to RUQ sono and fasting lipids were recommended.  4/17/2024 Would consider for careful clinical follow-up given recent nuclear stress test is negative and EKG is unchanged and symptoms are atypical for angina. 1 month follow-up .report symptoms. fasting lipids continue aspirin for prior cardiac stent  6/11/2024 Echocardiograph requested in view of a change in the cardiac exam with a diminished S1 and S2 consistent with valvular heart disease and possible  aortic stenosis in the setting of advanced coronary disease  EKG indicated for coronary artery disease  medical necessity this is high risk encounter based upon recurrent atypical symptoms in the setting of known advanced coronary artery disease and possible aortic stenosis and review of lipids

## 2024-06-11 NOTE — REASON FOR VISIT
[FreeTextEntry1] : no vaccine no reason per se ? recent  allergy.  we note triglycerides of 600 and wonder if this is a fasting sample. We will discontinue clopidogrel one year after a cardiac stent repeat lipids requested. periodic stress testing advised  4/219/23 jeb was undergoing chiropractic care and lumbi Xray reported a right upper quadrant calcification measure 1.5 cm RUQ sono was recommended.  4/17/2024 Jeb has had some recent shoulder pains dental pains jaw pain extractions are being considered he wonders whether or not this could be cardiac in origin he also tells me he has got a known rotator cuff on his right shoulder and probably has 1 on the left with a torn ligament he feels like he is getting older the symptoms are Non exertional recent nuclear stress test was negative for ischemia.  Possible recent sinus infection underwent a chest x-ray with Dr. Huber results pending.  6/11/2024 Jeb doing well overall overall recently underwent lipids with Dr. Fabry grants will review results.

## 2024-06-26 ENCOUNTER — APPOINTMENT (OUTPATIENT)
Dept: CARDIOLOGY | Facility: CLINIC | Age: 69
End: 2024-06-26
Payer: MEDICARE

## 2024-06-26 PROCEDURE — 93306 TTE W/DOPPLER COMPLETE: CPT

## 2024-09-23 ENCOUNTER — RX RENEWAL (OUTPATIENT)
Age: 69
End: 2024-09-23

## 2024-11-29 ENCOUNTER — RX RENEWAL (OUTPATIENT)
Age: 69
End: 2024-11-29

## 2024-12-21 ENCOUNTER — RX RENEWAL (OUTPATIENT)
Age: 69
End: 2024-12-21

## 2025-09-08 ENCOUNTER — RX RENEWAL (OUTPATIENT)
Age: 70
End: 2025-09-08